# Patient Record
Sex: FEMALE | Race: WHITE | NOT HISPANIC OR LATINO | Employment: FULL TIME | URBAN - METROPOLITAN AREA
[De-identification: names, ages, dates, MRNs, and addresses within clinical notes are randomized per-mention and may not be internally consistent; named-entity substitution may affect disease eponyms.]

---

## 2017-05-08 ENCOUNTER — OFFICE VISIT (OUTPATIENT)
Dept: NEUROSURGERY | Facility: CLINIC | Age: 52
End: 2017-05-08
Attending: NEUROLOGICAL SURGERY
Payer: COMMERCIAL

## 2017-05-08 VITALS
DIASTOLIC BLOOD PRESSURE: 78 MMHG | RESPIRATION RATE: 16 BRPM | OXYGEN SATURATION: 97 % | BODY MASS INDEX: 26.78 KG/M2 | HEART RATE: 58 BPM | TEMPERATURE: 98.7 F | WEIGHT: 171 LBS | SYSTOLIC BLOOD PRESSURE: 138 MMHG

## 2017-05-08 DIAGNOSIS — D49.7 SPINAL CORD TUMOR: Primary | ICD-10-CM

## 2017-05-08 PROCEDURE — 99211 OFF/OP EST MAY X REQ PHY/QHP: CPT | Mod: ZF

## 2017-05-08 ASSESSMENT — PAIN SCALES - GENERAL: PAINLEVEL: MILD PAIN (2)

## 2017-05-08 NOTE — LETTER
5/8/2017       RE: Marj Holliday  [address] [place] [address]     Dear Colleague,    Thank you for referring your patient, Marj Holliday, to the [facility]. Please see a copy of my visit note below.    HISTORY OF PRESENT ILLNESS:  Ms. Holliday is a 51-year-old female returning to [facility] for followup of an intramedullary spinal cord mass that was initially detected in 05/2016.  At that time, she was initially offered surgery, however, she has continued to elect for monitoring.      Ms. Holliday states today that her symptoms have remained unchanged over the interval surveillance period.  She continues to have pain in her left groin and posterior thigh area.  The pain is sometimes associated with tingling.  The region affected has not increased in size or severity or frequency of occurrence.  She denies bowel or bladder dysfunction.  Denies imbalance.      Vitals:    05/08/17 1521   BP: 138/78   Pulse: 58   Resp: 16   Temp: 98.7  F (37.1  C)   SpO2: 97%   Weight: 77.6 kg (171 lb)     Body mass index is 26.78 kg/(m^2).  Mild Pain (2)   PHYSICAL EXAMINATION:  The patient is alert and appropriately communicative.  Sensation is intact in bilateral lower extremities.  Strength 5/5 in bilateral lower extremities.  DTRs were slightly hyperreflexic in bilateral lower extremities when compared to her upper extremities.  No clonus on right.  Very subtle 1-2 beat clonus noted in the left.  Negative Babinski bilaterally.      IMAGING:  Lumbar MRI dated 05/01/2017 was reviewed today in clinic.  This study demonstrates stable intramedullary spinal cord mass when compared to prior image.  Remains stable since 05/2016.  Measurement of mass is approximately 9x14 mm.      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  The patient has had stable symptoms.  Imaging has also remained stable over the interval surveillance period.  At this  time, it is reasonable to continue surveillance of the mass.      PLAN:  The patient to follow up in clinic in 6 months' time.  Prior to followup visit, the patient is to obtain a surveillance thoracic MRI at [facility].    I saw the patient with the resident.  I have reviewed and edited the resident note and agree with the plan of care.      Lavinia Finley MD       DICTATED BY:  Horacio Porter M.D., Resident         LAVINIA FINLEY MD            D: 2017 16:27   T: 2017 09:21   MT: SAL[place]OR      Name:     DAVID STEARNS   MRN:      [id number]        Account:      [id number]   :      1965           Service Date: 2017      Document: [id number]

## 2017-05-08 NOTE — MR AVS SNAPSHOT
After Visit Summary   5/8/2017    Marj Holliday    MRN: [id number]           Patient Information     Date Of Birth          1965        Visit Information        Provider Department      5/8/2017 3:30 PM Aung Back MD [facility]        Today's Diagnoses     Spinal cord tumor    -  1       Follow-ups after your visit        Follow-up notes from your care team     Return in about 6 months (around 11/8/2017) for Imaging.      Who to contact     If you have questions or need follow up information about today's clinic visit or your schedule please contact [facility] directly at [phone].  Normal or non-critical lab and imaging results will be communicated to you by MyChart, letter or phone within 4 business days after the clinic has received the results. If you do not hear from us within 7 days, please contact the clinic through [employer] Onehart or phone. If you have a critical or abnormal lab result, we will notify you by phone as soon as possible.  Submit refill requests through PerfectPost or call your pharmacy and they will forward the refill request to us. Please allow 3 business days for your refill to be completed.          Additional Information About Your Visit        MyChart Information     PerfectPost gives you secure access to your electronic health record. If you see a primary care provider, you can also send messages to your care team and make appointments. If you have questions, please call your primary care clinic.  If you do not have a primary care provider, please call [phone] and they will assist you.        Care EveryWhere ID     This is your Care EveryWhere ID. This could be used by other organizations to access your [facility] medical records  [id number]        Your Vitals Were     Pulse Temperature Respirations Pulse Oximetry BMI (Body Mass Index)       58 98.7  F (37.1  C) 16 97% 26.78 kg/m2        Blood Pressure from Last 3  Encounters:   05/08/17 138/78   12/19/16 116/77   08/15/16 124/83    Weight from Last 3 Encounters:   05/08/17 77.6 kg (171 lb)   12/19/16 77.6 kg (171 lb 1.6 oz)   08/15/16 78 kg (171 lb 15.3 oz)               Primary Care Provider Office Phone # Fax #    Aung Joshua Back [phone]        [facility] [address] [place] [address] [id number]  [facility] [address]        Thank you!     Thank you for choosing [facility]  for your care. Our goal is always to provide you with excellent care. Hearing back from our patients is one way we can continue to improve our services. Please take a few minutes to complete the written survey that you may receive in the mail after your visit with us. Thank you!             Your Updated Medication List - Protect others around you: Learn how to safely use, store and throw away your medicines at www.disposemymeds.org.      Notice  As of 5/8/2017 11:59 PM    You have not been prescribed any medications.

## 2017-05-08 NOTE — NURSING NOTE
"Oncology Rooming Note    May 8, 2017 3:23 PM   Marj Holliday is a 51 year old female who presents for:    Chief Complaint   Patient presents with     RECHECK     Patient here for follow up visit r/t spinal cord tumor.      Initial Vitals: /78  Pulse 58  Temp 98.7  F (37.1  C)  Resp 16  Wt 77.6 kg (171 lb)  SpO2 97%  BMI 26.78 kg/m2 Estimated body mass index is 26.78 kg/(m^2) as calculated from the following:    Height as of 5/6/08: 1.702 m (5' 7\").    Weight as of this encounter: 77.6 kg (171 lb). Body surface area is 1.92 meters squared.  Mild Pain (2) Comment: Data Unavailable   No LMP recorded.  Allergies reviewed: Yes  Medications reviewed: Yes    Medications: Medication refills not needed today.  Pharmacy name entered into [employer]: [id number] - [place], [place] - [address]    Clinical concerns: NA     3 minutes for nursing intake (face to face time)     Paige Quinones RN              "

## 2017-05-09 NOTE — PROGRESS NOTES
HISTORY OF PRESENT ILLNESS:  Ms. Holliday is a 51-year-old female returning to [facility] for followup of an intramedullary spinal cord mass that was initially detected in 05/2016.  At that time, she was initially offered surgery, however, she has continued to elect for monitoring.      Ms. Holliday states today that her symptoms have remained unchanged over the interval surveillance period.  She continues to have pain in her left groin and posterior thigh area.  The pain is sometimes associated with tingling.  The region affected has not increased in size or severity or frequency of occurrence.  She denies bowel or bladder dysfunction.  Denies imbalance.      Vitals:    05/08/17 1521   BP: 138/78   Pulse: 58   Resp: 16   Temp: 98.7  F (37.1  C)   SpO2: 97%   Weight: 77.6 kg (171 lb)     Body mass index is 26.78 kg/(m^2).  Mild Pain (2)   PHYSICAL EXAMINATION:  The patient is alert and appropriately communicative.  Sensation is intact in bilateral lower extremities.  Strength 5/5 in bilateral lower extremities.  DTRs were slightly hyperreflexic in bilateral lower extremities when compared to her upper extremities.  No clonus on right.  Very subtle 1-2 beat clonus noted in the left.  Negative Babinski bilaterally.      IMAGING:  Lumbar MRI dated 05/01/2017 was reviewed today in clinic.  This study demonstrates stable intramedullary spinal cord mass when compared to prior image.  Remains stable since 05/2016.  Measurement of mass is approximately 9x14 mm.      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  The patient has had stable symptoms.  Imaging has also remained stable over the interval surveillance period.  At this time, it is reasonable to continue surveillance of the mass.      PLAN:  The patient to follow up in clinic in 6 months' time.  Prior to followup visit, the patient is to obtain a surveillance thoracic MRI at [facility].    I saw the patient with the  resident.  I have reviewed and edited the resident note and agree with the plan of care.      Lavinia Finley MD       DICTATED BY:  Elliott Porter M.D., Resident         LAVINIA FINLEY MD       As dictated by ELLIOTT PORTER MD            D: 2017 16:27   T: 2017 09:21   MT: JV      Name:     DAVID STEARNS   MRN:      [id number]        Account:      [id number]   :      1965           Service Date: 2017      Document: [id number]

## 2017-05-30 ENCOUNTER — HOSPITAL ENCOUNTER (OUTPATIENT)
Dept: MAMMOGRAPHY | Facility: CLINIC | Age: 52
Discharge: HOME | End: 2017-05-30
Attending: OBSTETRICS & GYNECOLOGY | Admitting: OBSTETRICS & GYNECOLOGY
Payer: COMMERCIAL

## 2017-05-30 DIAGNOSIS — Z12.31 VISIT FOR SCREENING MAMMOGRAM: ICD-10-CM

## 2017-05-30 PROCEDURE — G0202 SCR MAMMO BI INCL CAD: HCPCS

## 2017-06-17 ENCOUNTER — HEALTH MAINTENANCE LETTER (OUTPATIENT)
Age: 52
End: 2017-06-17

## 2017-11-13 ENCOUNTER — OFFICE VISIT (OUTPATIENT)
Dept: NEUROSURGERY | Facility: CLINIC | Age: 52
End: 2017-11-13
Attending: NEUROLOGICAL SURGERY
Payer: COMMERCIAL

## 2017-11-13 VITALS
BODY MASS INDEX: 28.05 KG/M2 | DIASTOLIC BLOOD PRESSURE: 77 MMHG | SYSTOLIC BLOOD PRESSURE: 136 MMHG | RESPIRATION RATE: 18 BRPM | WEIGHT: 179.1 LBS | OXYGEN SATURATION: 100 % | HEART RATE: 60 BPM | TEMPERATURE: 98.1 F

## 2017-11-13 DIAGNOSIS — D49.7 SPINAL CORD TUMOR: Primary | ICD-10-CM

## 2017-11-13 PROCEDURE — 99212 OFFICE O/P EST SF 10 MIN: CPT | Mod: ZF

## 2017-11-13 ASSESSMENT — PAIN SCALES - GENERAL: PAINLEVEL: MILD PAIN (3)

## 2017-11-13 NOTE — MR AVS SNAPSHOT
After Visit Summary   11/13/2017    Marj Holliday    MRN: [id number]           Patient Information     Date Of Birth          1965        Visit Information        Provider Department      11/13/2017 4:00 PM Aung Back MD [facility]        Today's Diagnoses     Spinal cord tumor    -  1       Follow-ups after your visit        Follow-up notes from your care team     Return in about 6 months (around 5/13/2018) for Imaging.      Your next 10 appointments already scheduled     May 14, 2018  4:00 PM CDT   (Arrive by 3:45 PM)   Return Visit with Aung Back MD   [facility]    [address]  [address] [facility] [address] [phone]              Who to contact     If you have questions or need follow up information about today's clinic visit or your schedule please contact [facility] directly at [phone].  Normal or non-critical lab and imaging results will be communicated to you by OneSpothart, letter or phone within 4 business days after the clinic has received the results. If you do not hear from us within 7 days, please contact the clinic through PopUpt or phone. If you have a critical or abnormal lab result, we will notify you by phone as soon as possible.  Submit refill requests through [email or web address] or call your pharmacy and they will forward the refill request to us. Please allow 3 business days for your refill to be completed.          Additional Information About Your Visit        MyChart Information     [email or web address] gives you secure access to your electronic health record. If you see a primary care provider, you can also send messages to your care team and make appointments. If you have questions, please call your primary care clinic.  If you do not have a primary care provider, please call [phone] and they will assist you.        Care EveryWhere ID     This  is your Care EveryWhere ID. This could be used by other organizations to access your [facility] medical records  [id number]        Your Vitals Were     Pulse Temperature Respirations Pulse Oximetry BMI (Body Mass Index)       60 98.1  F (36.7  C) (Oral) 18 100% 28.05 kg/m2        Blood Pressure from Last 3 Encounters:   11/13/17 136/77   05/08/17 138/78   12/19/16 116/77    Weight from Last 3 Encounters:   11/13/17 81.2 kg (179 lb 1.6 oz)   05/08/17 77.6 kg (171 lb)   12/19/16 77.6 kg (171 lb 1.6 oz)               Primary Care Provider Office Phone # Fax #    Aung Joshua Back [phone]       [id number]  [facility] [address]        Equal Access to Services     [facility]: Hadii aad ku hadasho Soomaali, waaxda luqadaha, qaybta kaalmada adeegyada, waxay lawandain hayaan amada hall . So [facility] [phone].    ATENCIÓN: Si habla español, tiene a cai disposición servicios gratuitos de asistencia lingüística. Solaame al [phone].    We comply with applicable federal civil rights laws and [place] laws. We do not discriminate on the basis of race, color, national origin, age, disability, sex, sexual orientation, or gender identity.            Thank you!     Thank you for choosing [facility]  for your care. Our goal is always to provide you with excellent care. Hearing back from our patients is one way we can continue to improve our services. Please take a few minutes to complete the written survey that you may receive in the mail after your visit with us. Thank you!             Your Updated Medication List - Protect others around you: Learn how to safely use, store and throw away your medicines at www.disposemymeds.org.          This list is accurate as of: 11/13/17 11:59 PM.  Always use your most recent med list.                   Brand Name Dispense Instructions for use Diagnosis    ADVIL PO

## 2017-11-13 NOTE — PROGRESS NOTES
HISTORY OF PRESENT ILLNESS:  It was a pleasure to see Ms. Holliday back in Neurosurgery Clinic today for a 6-month followup of a T12 intramedullary mass.  As you may recall, the mass was initially discovered in 05/2016.  Following discovery of the mass, there was a discussion about possible surgical resection versus watchful waiting.  After subsequent surveillance scans, as well as a second opinion at [facility], she elected to proceed with watchful waiting.      In clinic today, Ms. Holliday states she has not had any progression of her symptoms, nor any new symptoms.  She does note she was recently diagnosed with right-sided TMJ, and has recently received a splint.  Otherwise, she has no new concerns.      PHYSICAL EXAMINATION:   GENERAL:  This is a middle-aged female sitting in the exam chair in no acute distress.   STRENGTH:  5/5 and symmetric in the lower extremities.   REFLEXES:  Slightly hyperreflexic throughout the lower extremities.  There are 1-2 beats of clonus noted in bilateral lower extremities.   SENSATION:  Intact to light touch throughout.      IMAGING:  MRI of the lumbar spine acquired 11/07/2017 was reviewed and demonstrates no significant change of the intramedullary mass located at approximately T12, compared to prior studies going back to the initial scan in 05/2016.      ASSESSMENT:  Ms. Holliday is a 52-year-old female with an intramedullary spinal cord lesion at approximately the T12 level, which has been stable for approximately 1-1/2 years.  We discussed continuation of the current surveillance regimen, and the patient was in agreement.      PLAN:  Follow up in 6 months with a repeat MRI of the lumbar spine including T12 with and without contrast prior to the visit.      I saw the patient with the resident.  I have reviewed and edited the resident note and agree with the plan of care.      MD LAVINIA Panchal MD       As dictated by JOELLEN SLOAN MD, PHD             D: 2017 16:39   T: 2017 16:54   MT:       Name:     DAVID STEARNS   MRN:      [id number]        Account:      [id number]   :      1965           Service Date: 2017      Document: [id number]

## 2017-11-13 NOTE — LETTER
11/13/2017       RE: Marj Holliday  [address] [place] [address]     Dear Colleague,    Thank you for referring your patient, Marj Holliday, to the [facility]. Please see a copy of my visit note below.    HISTORY OF PRESENT ILLNESS:  It was a pleasure to see Ms. Holliday back in Neurosurgery Clinic today for a 6-month followup of a T12 intramedullary mass.  As you may recall, the mass was initially discovered in 05/2016.  Following discovery of the mass, there was a discussion about possible surgical resection versus watchful waiting.  After subsequent surveillance scans, as well as a second opinion at [facility], she elected to proceed with watchful waiting.      In clinic today, Ms. Holliday states she has not had any progression of her symptoms, nor any new symptoms.  She does note she was recently diagnosed with right-sided TMJ, and has recently received a splint.  Otherwise, she has no new concerns.      PHYSICAL EXAMINATION:   GENERAL:  This is a middle-aged female sitting in the exam chair in no acute distress.   STRENGTH:  5/5 and symmetric in the lower extremities.   REFLEXES:  Slightly hyperreflexic throughout the lower extremities.  There are 1-2 beats of clonus noted in bilateral lower extremities.   SENSATION:  Intact to light touch throughout.      IMAGING:  MRI of the lumbar spine acquired 11/07/2017 was reviewed and demonstrates no significant change of the intramedullary mass located at approximately T12, compared to prior studies going back to the initial scan in 05/2016.      ASSESSMENT:  Ms. Holliday is a 52-year-old female with an intramedullary spinal cord lesion at approximately the T12 level, which has been stable for approximately 1-1/2 years.  We discussed continuation of the current surveillance regimen, and the patient was in agreement.      PLAN:  Follow up in 6 months with a repeat MRI of the lumbar spine including T12 with and without contrast  prior to the visit.      I saw the patient with the resident.  I have reviewed and edited the resident note and agree with the plan of care.      MD LAVINIA Panchal MD       As dictated by JOELLEN SLOAN MD, PHD            D: 2017 16:39   T: 2017 16:54   MT: MARIBEL      Name:     DAVID STEARNS   MRN:      [id number]        Account:      [id number]   :      1965           Service Date: 2017      Document: [id number]        Again, thank you for allowing me to participate in the care of your patient.      Sincerely,    Lavinia Back MD

## 2017-11-13 NOTE — NURSING NOTE
"Oncology Rooming Note    November 13, 2017 3:56 PM   Marj Holliday is a 52 year old female who presents for:    Chief Complaint   Patient presents with     RECHECK     Patient with spinal cord tumor here for provider     Initial Vitals: /77  Pulse 60  Temp 98.1  F (36.7  C) (Oral)  Resp 18  Wt 81.2 kg (179 lb 1.6 oz)  SpO2 100%  BMI 28.05 kg/m2 Estimated body mass index is 28.05 kg/(m^2) as calculated from the following:    Height as of 5/6/08: 1.702 m (5' 7\").    Weight as of this encounter: 81.2 kg (179 lb 1.6 oz). Body surface area is 1.96 meters squared.  Mild Pain (3) Comment: Data Unavailable   No LMP recorded.  Allergies reviewed: Yes  Medications reviewed: Yes    Medications: Medication refills not needed today.  Pharmacy name entered into UpCity: [id number] - [place], [place] - [address]    Clinical concerns: NA NA was NOT notified.    5 minutes for nursing intake (face to face time)     Jackie Billingsley CMA              "

## 2018-05-07 ENCOUNTER — TRANSFERRED RECORDS (OUTPATIENT)
Dept: HEALTH INFORMATION MANAGEMENT | Facility: CLINIC | Age: 53
End: 2018-05-07

## 2018-05-14 ENCOUNTER — OFFICE VISIT (OUTPATIENT)
Dept: NEUROSURGERY | Facility: CLINIC | Age: 53
End: 2018-05-14
Attending: NEUROLOGICAL SURGERY
Payer: COMMERCIAL

## 2018-05-14 VITALS
RESPIRATION RATE: 18 BRPM | WEIGHT: 176.4 LBS | SYSTOLIC BLOOD PRESSURE: 118 MMHG | TEMPERATURE: 97.3 F | OXYGEN SATURATION: 99 % | HEIGHT: 67 IN | DIASTOLIC BLOOD PRESSURE: 80 MMHG | BODY MASS INDEX: 27.69 KG/M2 | HEART RATE: 60 BPM

## 2018-05-14 DIAGNOSIS — D49.7 SPINAL CORD TUMOR: Primary | ICD-10-CM

## 2018-05-14 PROCEDURE — G0463 HOSPITAL OUTPT CLINIC VISIT: HCPCS | Mod: ZF

## 2018-05-14 ASSESSMENT — PAIN SCALES - GENERAL: PAINLEVEL: MILD PAIN (2)

## 2018-05-14 NOTE — NURSING NOTE
"Oncology Rooming Note    May 14, 2018 4:13 PM   Marj Holliday is a 52 year old female who presents for:    Chief Complaint   Patient presents with     RECHECK     Spinal cord tumor     Initial Vitals: /80 (BP Location: Right arm, Patient Position: Chair, Cuff Size: Adult Regular)  Pulse 60  Temp 97.3  F (36.3  C) (Oral)  Resp 18  Ht 1.702 m (5' 7.01\")  Wt 80 kg (176 lb 6.4 oz)  LMP 04/22/2008  SpO2 99%  Breastfeeding? No  BMI 27.62 kg/m2 Estimated body mass index is 27.62 kg/(m^2) as calculated from the following:    Height as of this encounter: 1.702 m (5' 7.01\").    Weight as of this encounter: 80 kg (176 lb 6.4 oz). Body surface area is 1.94 meters squared.  Mild Pain (2) Comment: TMJ/groin/back of thy/ headaches   Patient's last menstrual period was 04/22/2008.  Allergies reviewed: Yes  Medications reviewed: Yes    Medications: Medication refills not needed today.  Pharmacy name entered into [employer]: [id number] - [place], [place] - [address]    Clinical concerns:  No new concerns  Provider was notified.    7 minutes for nursing intake (face to face time)     Kati Bahena MA              "

## 2018-05-14 NOTE — LETTER
"5/14/2018       RE: Marj Holliday  [address] [place] [address]     Dear Colleague,    Thank you for referring your patient, Marj Holliday, to the [facility]ER [facility]INIC. Please see a copy of my visit note below.    It was a pleasure to see Marj Holliday today in Neurosurgery Clinic. She is a 52 year old female with a T12 spinal cord lesion we have been following since 2016. No new symptoms.    Vitals:    05/14/18 1608   BP: 118/80   BP Location: Right arm   Patient Position: Chair   Cuff Size: Adult Regular   Pulse: 60   Resp: 18   Temp: 97.3  F (36.3  C)   TempSrc: Oral   SpO2: 99%   Weight: 80 kg (176 lb 6.4 oz)   Height: 1.702 m (5' 7.01\")     Body mass index is 27.62 kg/(m^2).  Mild Pain (2)    Awake, alert.  Neuro intact.    Imaging: MRI appears stable. No changes since May 2016.    Assessment: Spinal cord tumor. Stable.    Plan: RTC 6m + MRI.     Again, thank you for allowing me to participate in the care of your patient.      Sincerely,    Aung Back MD      "

## 2018-05-14 NOTE — PROGRESS NOTES
"It was a pleasure to see Marj Holliday today in Neurosurgery Clinic. She is a 52 year old female with a T12 spinal cord lesion we have been following since 2016. No new symptoms.    Vitals:    05/14/18 1608   BP: 118/80   BP Location: Right arm   Patient Position: Chair   Cuff Size: Adult Regular   Pulse: 60   Resp: 18   Temp: 97.3  F (36.3  C)   TempSrc: Oral   SpO2: 99%   Weight: 80 kg (176 lb 6.4 oz)   Height: 1.702 m (5' 7.01\")     Body mass index is 27.62 kg/(m^2).  Mild Pain (2)    Awake, alert.  Neuro intact.    Imaging: MRI appears stable. No changes since May 2016.    Assessment: Spinal cord tumor. Stable.    Plan: RTC 6m + MRI.   "

## 2018-05-14 NOTE — MR AVS SNAPSHOT
After Visit Summary   5/14/2018    Marj Holliday    MRN: [id number]           Patient Information     Date Of Birth          1965        Visit Information        Provider Department      5/14/2018 4:00 PM Aung Back MD [facility], located within [facility]        Today's Diagnoses     Spinal cord tumor    -  1       Follow-ups after your visit        Follow-up notes from your care team     Return in about 6 months (around 11/14/2018) for Imaging.      Your next 10 appointments already scheduled     Nov 12, 2018  4:00 PM CST   (Arrive by 3:45 PM)   Return Visit with Aung Back MD   [facility] ([facility] [place])    [address]  [address]  [facility] [address] [phone]              Who to contact     If you have questions or need follow up information about today's clinic visit or your schedule please contact [facility] directly at [phone].  Normal or non-critical lab and imaging results will be communicated to you by Atterohart, letter or phone within 4 business days after the clinic has received the results. If you do not hear from us within 7 days, please contact the clinic through Casabit or phone. If you have a critical or abnormal lab result, we will notify you by phone as soon as possible.  Submit refill requests through Chumby or call your pharmacy and they will forward the refill request to us. Please allow 3 business days for your refill to be completed.          Additional Information About Your Visit        MyChart Information     Chumby gives you secure access to your electronic health record. If you see a primary care provider, you can also send messages to your care team and make appointments. If you have questions, please call your primary care clinic.  If you do not have a primary care provider, please call [phone] and they will assist you.        Care EveryWhere ID     This is  "your Care EveryWhere ID. This could be used by other organizations to access your [place] medical records  [id number]        Your Vitals Were     Pulse Temperature Respirations Height Last Period Pulse Oximetry    60 97.3  F (36.3  C) (Oral) 18 1.702 m (5' 7.01\") 04/22/2008 99%    Breastfeeding? BMI (Body Mass Index)                No 27.62 kg/m2           Blood Pressure from Last 3 Encounters:   05/14/18 118/80   11/13/17 136/77   05/08/17 138/78    Weight from Last 3 Encounters:   05/14/18 80 kg (176 lb 6.4 oz)   11/13/17 81.2 kg (179 lb 1.6 oz)   05/08/17 77.6 kg (171 lb)               Primary Care Provider Office Phone # Fax #    Aung Joshua Back [phone]       [id number]  [facility] [address]        Equal Access to Services     GRAYSON [facility]: Hadii aad ku hadasho Soomaali, waaxda luqadaha, qaybta kaalmada adeegyada, waxay idiin hayjennyn amada rojas larashaad . So [facility] [phone].    ATENCIÓN: Si habla español, tiene a cai disposición servicios gratuitos de asistencia lingüística. [facility] [phone].    We comply with applicable federal civil rights laws and [place] laws. We do not discriminate on the basis of race, color, national origin, age, disability, sex, sexual orientation, or gender identity.            Thank you!     Thank you for choosing [facility] [place] [facility]  for your care. Our goal is always to provide you with excellent care. Hearing back from our patients is one way we can continue to improve our services. Please take a few minutes to complete the written survey that you may receive in the mail after your visit with us. Thank you!             Your Updated Medication List - Protect others around you: Learn how to safely use, store and throw away your medicines at www.disposemymeds.org.          This list is accurate as of 5/14/18 11:59 PM.  Always use your most recent med list.                   Brand Name Dispense Instructions for use Diagnosis "    ADVIL PO

## 2018-07-06 ENCOUNTER — HOSPITAL ENCOUNTER (OUTPATIENT)
Dept: MAMMOGRAPHY | Facility: CLINIC | Age: 53
Discharge: HOME | End: 2018-07-06
Attending: OBSTETRICS & GYNECOLOGY | Admitting: OBSTETRICS & GYNECOLOGY
Payer: COMMERCIAL

## 2018-07-06 DIAGNOSIS — Z12.31 VISIT FOR SCREENING MAMMOGRAM: ICD-10-CM

## 2018-07-06 PROCEDURE — 77063 BREAST TOMOSYNTHESIS BI: CPT

## 2018-08-07 ENCOUNTER — TELEPHONE (OUTPATIENT)
Dept: ONCOLOGY | Facility: CLINIC | Age: 53
End: 2018-08-07

## 2018-08-07 NOTE — TELEPHONE ENCOUNTER
----- Message from [facility] sent at 8/6/2018  3:58 PM CDT -----  Regarding: new/different headaches  ( Mon 306p) Marj called because she has started having a different kind of headache and wonders if she should have a full MRI and be seen by Dr. Back or a neurologist. Pls call [phone]

## 2018-08-07 NOTE — TELEPHONE ENCOUNTER
Returned call to patient. Unable to reach. LM for patient, recommended following up with PCP for headache workup, as headaches were not a symptom she has had related to the spinal tumor followed by Dr. Bakc. Asked patient to return call to clinic to further discuss.

## 2018-08-08 NOTE — TELEPHONE ENCOUNTER
Patient returned call to clinic. Describes her headaches starting in the base of her skull and around to R side to ear up to R eye. Describes as cluster and migraine like, improve with ibuprofen. She has recently been treated for TMJ problems. Patient questions if they could be related to T12 lesion and if she should have a change in her follow up imaging. Discussed with Dr. Back, not related to T12 lesion and no change in imaging. Called patient with recommendations. She voiced understanding and had no further questions at this time.

## 2018-11-05 ENCOUNTER — TRANSFERRED RECORDS (OUTPATIENT)
Dept: HEALTH INFORMATION MANAGEMENT | Facility: CLINIC | Age: 53
End: 2018-11-05

## 2018-11-12 ENCOUNTER — OFFICE VISIT (OUTPATIENT)
Dept: NEUROSURGERY | Facility: CLINIC | Age: 53
End: 2018-11-12
Attending: NEUROLOGICAL SURGERY
Payer: COMMERCIAL

## 2018-11-12 VITALS
WEIGHT: 179.9 LBS | HEART RATE: 98 BPM | DIASTOLIC BLOOD PRESSURE: 77 MMHG | SYSTOLIC BLOOD PRESSURE: 126 MMHG | BODY MASS INDEX: 28.17 KG/M2 | RESPIRATION RATE: 16 BRPM | OXYGEN SATURATION: 98 %

## 2018-11-12 DIAGNOSIS — D49.7 SPINAL CORD TUMOR: Primary | ICD-10-CM

## 2018-11-12 PROCEDURE — 40000114 ZZH STATISTIC NO CHARGE CLINIC VISIT

## 2018-11-12 NOTE — LETTER
11/12/2018       RE: Marj Holliday  [address] [place] [address]     Dear Colleague,    Thank you for referring your patient, Marj Holliday, to the [facility]. Please see a copy of my visit note below.    It was a pleasure to see Marj Holliday today in Neurosurgery Clinic. 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 She has been stable overall, and her main issues today seem to involve her TMJ. No new numbness tingling, weakness or any other concerning neurologic symptoms.    Vitals:    11/12/18 1512   BP: 126/77   Pulse: 98   Resp: 16   SpO2: 98%   Weight: 81.6 kg (179 lb 14.4 oz)     Body mass index is 28.17 kg/(m^2).  Data Unavailable    Awake, alert.  Neuro intact.    Imaging: MRI from [facility] is stable when compared back to May of 2016. The intramedullary lesion appears basically identical. There is another small area of enhancement at L4-5 that is also stable.    Assessment: Spinal intramedullary tumor. Stable.    Plan: She will come back in 1 year with a repeat MRI. She wishes to avoid contrast, given concerns about long term consequences of repeated contrast administration. Given the tumor is clearly visible on non-contrast sequences, we will order a non-contrast MRI for her next visit at [facility] one week prior to clinic visit, where she has done her previous images. She knows to call sooner for changes or worsening of symptoms.     Again, thank you for allowing me to participate in the care of your patient.      Sincerely,    Aung Back MD

## 2018-11-12 NOTE — PROGRESS NOTES
It was a pleasure to see Marj Holliday today in Neurosurgery Clinic. 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 She has been stable overall, and her main issues today seem to involve her TMJ. No new numbness tingling, weakness or any other concerning neurologic symptoms.    Vitals:    11/12/18 1512   BP: 126/77   Pulse: 98   Resp: 16   SpO2: 98%   Weight: 81.6 kg (179 lb 14.4 oz)     Body mass index is 28.17 kg/(m^2).  Data Unavailable    Awake, alert.  Neuro intact.    Imaging: MRI from [facility] is stable when compared back to May of 2016. The intramedullary lesion appears basically identical. There is another small area of enhancement at L4-5 that is also stable.    Assessment: Spinal intramedullary tumor. Stable.    Plan: She will come back in 1 year with a repeat MRI. She wishes to avoid contrast, given concerns about long term consequences of repeated contrast administration. Given the tumor is clearly visible on non-contrast sequences, we will order a non-contrast MRI for her next visit at [facility] one week prior to clinic visit, where she has done her previous images. She knows to call sooner for changes or worsening of symptoms.

## 2018-11-12 NOTE — MR AVS SNAPSHOT
After Visit Summary   11/12/2018    Marj Holliday    MRN: [id number]           Patient Information     Date Of Birth          1965        Visit Information        Provider Department      11/12/2018 3:15 PM Aung Back MD [facility]        Today's Diagnoses     Spinal cord tumor    -  1       Follow-ups after your visit        Follow-up notes from your care team     Return in about 1 year (around 11/12/2019) for Imaging.      Your next 10 appointments already scheduled     Nov 11, 2019  3:45 PM CST   (Arrive by 3:30 PM)   Return Visit with Aung Back MD   [facility] ([facility] [place])    [address]  [address]  [facility] [address] [phone]              Who to contact     If you have questions or need follow up information about today's clinic visit or your schedule please contact [facility] directly at [phone].  Normal or non-critical lab and imaging results will be communicated to you by TradeYahart, letter or phone within 4 business days after the clinic has received the results. If you do not hear from us within 7 days, please contact the clinic through Adayanat or phone. If you have a critical or abnormal lab result, we will notify you by phone as soon as possible.  Submit refill requests through [facility] or call your pharmacy and they will forward the refill request to us. Please allow 3 business days for your refill to be completed.          Additional Information About Your Visit        MyChart Information     [facility] gives you secure access to your electronic health record. If you see a primary care provider, you can also send messages to your care team and make appointments. If you have questions, please call your primary care clinic.  If you do not have a primary care provider, please call [phone] and they will assist you.        Care EveryWhere ID     This is  your Care EveryWhere ID. This could be used by other organizations to access your [place] medical records  [id number]        Your Vitals Were     Pulse Respirations Last Period Pulse Oximetry BMI (Body Mass Index)       98 16 04/22/2008 98% 28.17 kg/m2        Blood Pressure from Last 3 Encounters:   11/12/18 126/77   05/14/18 118/80   11/13/17 136/77    Weight from Last 3 Encounters:   11/12/18 81.6 kg (179 lb 14.4 oz)   05/14/18 80 kg (176 lb 6.4 oz)   11/13/17 81.2 kg (179 lb 1.6 oz)               Primary Care Provider Office Phone # Fax #    Aung Joshua Back [phone]       [id number]  [facility] [address]        Equal Access to Services     [facility] : Hadii meenakshi nicholson hadasho Soomaali, waaxda luqadaha, qaybta kaalmada adeegyada, precious rojas hayrebecca hall . So [facility] [phone].    ATENCIÓN: Si habla español, tiene a cai disposición servicios gratuitos de asistencia lingüística. Hayes al [phone].    We comply with applicable federal civil rights laws and [place] laws. We do not discriminate on the basis of race, color, national origin, age, disability, sex, sexual orientation, or gender identity.            Thank you!     Thank you for choosing [facility] [place] [facility]  for your care. Our goal is always to provide you with excellent care. Hearing back from our patients is one way we can continue to improve our services. Please take a few minutes to complete the written survey that you may receive in the mail after your visit with us. Thank you!             Your Updated Medication List - Protect others around you: Learn how to safely use, store and throw away your medicines at www.disposemymeds.org.          This list is accurate as of 11/12/18  3:38 PM.  Always use your most recent med list.                   Brand Name Dispense Instructions for use Diagnosis    ADVIL PO           MULTIPLE VITAMIN/IRON OR           PROBIOTIC PRODUCT PO       culturelle        WHEAT DEXTRIN PO

## 2019-07-10 ENCOUNTER — HOSPITAL ENCOUNTER (OUTPATIENT)
Dept: MAMMOGRAPHY | Facility: CLINIC | Age: 54
Discharge: HOME | End: 2019-07-10
Attending: OBSTETRICS & GYNECOLOGY | Admitting: OBSTETRICS & GYNECOLOGY
Payer: COMMERCIAL

## 2019-07-10 DIAGNOSIS — Z12.31 VISIT FOR SCREENING MAMMOGRAM: ICD-10-CM

## 2019-07-10 PROCEDURE — 77063 BREAST TOMOSYNTHESIS BI: CPT

## 2019-09-16 DIAGNOSIS — D49.7 SPINAL CORD TUMOR: Primary | ICD-10-CM

## 2019-10-01 ENCOUNTER — HEALTH MAINTENANCE LETTER (OUTPATIENT)
Age: 54
End: 2019-10-01

## 2019-11-08 ENCOUNTER — TRANSFERRED RECORDS (OUTPATIENT)
Dept: HEALTH INFORMATION MANAGEMENT | Facility: CLINIC | Age: 54
End: 2019-11-08

## 2019-11-22 ENCOUNTER — OFFICE VISIT (OUTPATIENT)
Dept: NEUROSURGERY | Facility: CLINIC | Age: 54
End: 2019-11-22
Attending: NEUROLOGICAL SURGERY
Payer: COMMERCIAL

## 2019-11-22 VITALS
DIASTOLIC BLOOD PRESSURE: 85 MMHG | OXYGEN SATURATION: 99 % | RESPIRATION RATE: 16 BRPM | WEIGHT: 178 LBS | HEART RATE: 55 BPM | BODY MASS INDEX: 27.94 KG/M2 | HEIGHT: 67 IN | SYSTOLIC BLOOD PRESSURE: 127 MMHG | TEMPERATURE: 98.1 F

## 2019-11-22 DIAGNOSIS — D49.7 SPINAL CORD TUMOR: Primary | ICD-10-CM

## 2019-11-22 PROCEDURE — 99213 OFFICE O/P EST LOW 20 MIN: CPT | Performed by: NEUROLOGICAL SURGERY

## 2019-11-22 PROCEDURE — G0463 HOSPITAL OUTPT CLINIC VISIT: HCPCS

## 2019-11-22 RX ORDER — INDOMETHACIN 25 MG/5ML
SUSPENSION ORAL
COMMUNITY

## 2019-11-22 ASSESSMENT — MIFFLIN-ST. JEOR: SCORE: 1445.03

## 2019-11-22 ASSESSMENT — PAIN SCALES - GENERAL: PAINLEVEL: NO PAIN (1)

## 2019-11-22 NOTE — NURSING NOTE
"Marj Holliday is a 54 year old female who presents for:  Chief Complaint   Patient presents with     Follow Up        Initial Vitals:  /85   Pulse 55   Temp 98.1  F (36.7  C) (Oral)   Resp 16   Ht 5' 7\" (1.702 m)   Wt 178 lb (80.7 kg)   LMP 04/22/2008   SpO2 99%   BMI 27.88 kg/m   Estimated body mass index is 27.88 kg/m  as calculated from the following:    Height as of this encounter: 5' 7\" (1.702 m).    Weight as of this encounter: 178 lb (80.7 kg).. Body surface area is 1.95 meters squared. BP completed using cuff size: regular  No Pain (1)        Nursing Comments: Pt present today for follow up.        Tyler Moran, [facility]    "

## 2019-11-22 NOTE — PROGRESS NOTES
It was a pleasure to see Marj Holliday today in Neurosurgery Clinic. She is a 54 year old female with a mass in the distal spinal cord that we have been following radiographically since 2016.  Her she has no new symptoms related to this.  She has had previous problems with headaches which have improved with treatment with indomethacin.    Past Medical History:   Diagnosis Date     Allergic rhinitis, cause unspecified      Past Surgical History:   Procedure Laterality Date     C NONSPECIFIC PROCEDURE      Brainard Teeth        Allergies   Allergen Reactions     No Known Drug Allergies        Current Outpatient Medications:      indomethacin (INDOCIN) 25 MG/5ML SUSP suspension, , Disp: , Rfl:      Multiple Vitamins-Iron (MULTIPLE VITAMIN/IRON OR), , Disp: , Rfl:      PANTOPRAZOLE SODIUM PO, , Disp: , Rfl:      PROBIOTIC PRODUCT PO, culturelle, Disp: , Rfl:      WHEAT DEXTRIN PO, , Disp: , Rfl:      Ibuprofen (ADVIL PO), , Disp: , Rfl:   Social History     Socioeconomic History     Marital status:      Spouse name: None     Number of children: None     Years of education: None     Highest education level: None   Occupational History     None   Social Needs     Financial resource strain: None     Food insecurity:     Worry: None     Inability: None     Transportation needs:     Medical: None     Non-medical: None   Tobacco Use     Smoking status: Never Smoker     Smokeless tobacco: Never Used   Substance and Sexual Activity     Alcohol use: None     Drug use: None     Sexual activity: None   Lifestyle     Physical activity:     Days per week: None     Minutes per session: None     Stress: None   Relationships     Social connections:     Talks on phone: None     Gets together: None     Attends Christianity service: None     Active member of club or organization: None     Attends meetings of clubs or organizations: None     Relationship status: None     Intimate partner violence:     Fear of current or ex  "partner: None     Emotionally abused: None     Physically abused: None     Forced sexual activity: None   Other Topics Concern     None   Social History Narrative     None          ROS: 10 point ROS neg other than the symptoms noted above in the HPI.    Vitals:    11/22/19 0900   BP: 127/85   Pulse: 55   Resp: 16   Temp: 98.1  F (36.7  C)   TempSrc: Oral   SpO2: 99%   Weight: 178 lb (80.7 kg)   Height: 5' 7\" (1.702 m)     Body mass index is 27.88 kg/m .  No Pain (1)    Awake alert and oriented.  Strength in bilateral upper and lower extremities 5 out of 5 in all muscle groups.  Gait normal.    Imaging: Her MRI of the lumbar spine shows no change in the mass in the distal spinal cord when compared to her previous MRI as well as back to 2016.  Imaging was reviewed with patient shown the patient in clinic today.    Assessment: Spinal cord tumor.  Stable.    Plan: We will have her get a repeat MRI without contrast in 1 year.  The without contrast is at her request which I think is reasonable.  She will get this prior to an appointment which will be scheduled next November.  We will provide her results when we have them and she may then cancel the appointment.      "

## 2020-07-24 ENCOUNTER — HOSPITAL ENCOUNTER (OUTPATIENT)
Dept: MAMMOGRAPHY | Facility: CLINIC | Age: 55
Discharge: HOME | End: 2020-07-24
Attending: OBSTETRICS & GYNECOLOGY | Admitting: OBSTETRICS & GYNECOLOGY
Payer: COMMERCIAL

## 2020-07-24 DIAGNOSIS — Z12.31 VISIT FOR SCREENING MAMMOGRAM: ICD-10-CM

## 2020-07-24 PROCEDURE — 77063 BREAST TOMOSYNTHESIS BI: CPT

## 2020-11-09 ENCOUNTER — TRANSFERRED RECORDS (OUTPATIENT)
Dept: HEALTH INFORMATION MANAGEMENT | Facility: CLINIC | Age: 55
End: 2020-11-09

## 2020-11-16 ENCOUNTER — OFFICE VISIT (OUTPATIENT)
Dept: NEUROSURGERY | Facility: CLINIC | Age: 55
End: 2020-11-16
Attending: NEUROLOGICAL SURGERY
Payer: COMMERCIAL

## 2020-11-16 VITALS
SYSTOLIC BLOOD PRESSURE: 124 MMHG | WEIGHT: 166.4 LBS | TEMPERATURE: 97.9 F | DIASTOLIC BLOOD PRESSURE: 78 MMHG | OXYGEN SATURATION: 98 % | HEIGHT: 67 IN | BODY MASS INDEX: 26.12 KG/M2 | HEART RATE: 66 BPM

## 2020-11-16 DIAGNOSIS — D49.7 SPINAL CORD TUMOR: Primary | ICD-10-CM

## 2020-11-16 PROCEDURE — 99213 OFFICE O/P EST LOW 20 MIN: CPT | Performed by: NEUROLOGICAL SURGERY

## 2020-11-16 PROCEDURE — G0463 HOSPITAL OUTPT CLINIC VISIT: HCPCS

## 2020-11-16 SDOH — HEALTH STABILITY: MENTAL HEALTH: HOW OFTEN DO YOU HAVE A DRINK CONTAINING ALCOHOL?: 2-3 TIMES A WEEK

## 2020-11-16 SDOH — HEALTH STABILITY: MENTAL HEALTH: HOW OFTEN DO YOU HAVE 6 OR MORE DRINKS ON ONE OCCASION?: NEVER

## 2020-11-16 SDOH — HEALTH STABILITY: MENTAL HEALTH: HOW MANY STANDARD DRINKS CONTAINING ALCOHOL DO YOU HAVE ON A TYPICAL DAY?: 1 OR 2

## 2020-11-16 ASSESSMENT — MIFFLIN-ST. JEOR: SCORE: 1387.42

## 2020-11-16 ASSESSMENT — PAIN SCALES - GENERAL: PAINLEVEL: MILD PAIN (2)

## 2020-11-16 NOTE — NURSING NOTE
"Marj Holliday is a 55 year old female who presents for:  Chief Complaint   Patient presents with     Neurologic Problem     F/U Spinal Cord Tumor        Initial Vitals:  /78 (BP Location: Right arm, Patient Position: Sitting, Cuff Size: Adult Regular)   Pulse 66   Temp 97.9  F (36.6  C) (Oral)   Ht 5' 7\" (1.702 m)   Wt 166 lb 6.4 oz (75.5 kg)   LMP 04/22/2008   SpO2 98%   BMI 26.06 kg/m   Estimated body mass index is 26.06 kg/m  as calculated from the following:    Height as of this encounter: 5' 7\" (1.702 m).    Weight as of this encounter: 166 lb 6.4 oz (75.5 kg).. Body surface area is 1.89 meters squared. BP completed using cuff size: regular  Mild Pain (2)    Nursing Comments: see chief complaint.     Иван Granda, NIRMAL    "

## 2020-11-16 NOTE — PROGRESS NOTES
"It was a pleasure to see Marj Holliday today in Neurosurgery Clinic. She is a 55 year old female who is here for follow-up of her spinal cord tumor.  She continues to be relatively asymptomatic but does have some intermittent sciatica-like symptoms into the backs of her thighs.  She denies any significant back pain or other neurologic symptoms.  We have been observing her lower spinal cord tumor since 2016.    Vitals:    11/16/20 1257   BP: 124/78   BP Location: Right arm   Patient Position: Sitting   Cuff Size: Adult Regular   Pulse: 66   Temp: 97.9  F (36.6  C)   TempSrc: Oral   SpO2: 98%   Weight: 75.5 kg (166 lb 6.4 oz)   Height: 1.702 m (5' 7\")     Body mass index is 26.06 kg/m .  Mild Pain (2)    Awake and alert.  Neurologically intact.    Imaging: MRI of the lumbar spine from a week ago appears stable both in size and enhancement when compared back through her 4 years of MRI.  Imaging was reviewed with the patient shown to the patient in clinic today     Assessment: Spinal cord tumor, stable    Plan: We will have her return to clinic in 1 year with a repeat MRI.  We will defer contrast use next year unless absolutely needed.     "

## 2021-01-15 ENCOUNTER — HEALTH MAINTENANCE LETTER (OUTPATIENT)
Age: 56
End: 2021-01-15

## 2021-06-16 ENCOUNTER — TELEPHONE (OUTPATIENT)
Dept: NEUROSURGERY | Facility: CLINIC | Age: 56
End: 2021-06-16

## 2021-06-16 NOTE — TELEPHONE ENCOUNTER
Left voice message for patient to return call to schedule a follow up appointment in 1 year from 11/16/20, with an MRI prior.

## 2021-08-02 ENCOUNTER — HOSPITAL ENCOUNTER (OUTPATIENT)
Dept: MAMMOGRAPHY | Facility: CLINIC | Age: 56
Discharge: HOME | End: 2021-08-02
Attending: OBSTETRICS & GYNECOLOGY | Admitting: OBSTETRICS & GYNECOLOGY
Payer: COMMERCIAL

## 2021-08-02 DIAGNOSIS — Z12.31 VISIT FOR SCREENING MAMMOGRAM: ICD-10-CM

## 2021-08-02 PROCEDURE — 77063 BREAST TOMOSYNTHESIS BI: CPT

## 2021-09-04 ENCOUNTER — HEALTH MAINTENANCE LETTER (OUTPATIENT)
Age: 56
End: 2021-09-04

## 2022-02-19 ENCOUNTER — HEALTH MAINTENANCE LETTER (OUTPATIENT)
Age: 57
End: 2022-02-19

## 2022-08-04 ENCOUNTER — HOSPITAL ENCOUNTER (OUTPATIENT)
Dept: MAMMOGRAPHY | Facility: CLINIC | Age: 57
Discharge: HOME | End: 2022-08-04
Payer: COMMERCIAL

## 2022-08-04 DIAGNOSIS — Z12.31 VISIT FOR SCREENING MAMMOGRAM: ICD-10-CM

## 2022-08-04 PROCEDURE — 77067 SCR MAMMO BI INCL CAD: CPT

## 2022-09-20 ENCOUNTER — MYC MEDICAL ADVICE (OUTPATIENT)
Dept: RADIATION ONCOLOGY | Facility: CLINIC | Age: 57
End: 2022-09-20

## 2022-09-20 DIAGNOSIS — D49.7 SPINAL CORD TUMOR: Primary | ICD-10-CM

## 2022-09-21 NOTE — TELEPHONE ENCOUNTER
Patient sent Simplee message stating she is an ongoing patient of Dr. Back and requesting repeat imaging.    Last MRI (lumbar spine with and without contrast) done on 11/9/2020.    Encounter routed to Dr. Back for review and recommendations.

## 2022-09-21 NOTE — TELEPHONE ENCOUNTER
Dr. Back would prefer imaging with contrast as it provides a better view, but if patient has concerns with contrast it can be deferred that this time. If patient does imaging without contrast and things look worse, we can always place another order for imaging with contrast if needed.     Update sent to patient via Swirl.

## 2022-09-23 NOTE — TELEPHONE ENCOUNTER
Per patient she would like MRI with contrast.     Order placed for lumbar MRI with and without contrast. [facility].     Advised patient via MyChart to contact neurosurgery clinic once imaging is complete so we can get the images and report transferred into the  [employer] system.    Faxed order for lumbar MRI September 23, 2022 to fax number [phone] ([facility] [employer])    Right Fax confirmed at 9:51 AM

## 2022-10-22 ENCOUNTER — HEALTH MAINTENANCE LETTER (OUTPATIENT)
Age: 57
End: 2022-10-22

## 2023-02-07 ENCOUNTER — LAB REQUISITION (OUTPATIENT)
Dept: LAB | Facility: CLINIC | Age: 58
End: 2023-02-07

## 2023-02-07 DIAGNOSIS — Z01.419 ENCOUNTER FOR GYNECOLOGICAL EXAMINATION (GENERAL) (ROUTINE) WITHOUT ABNORMAL FINDINGS: ICD-10-CM

## 2023-02-07 PROCEDURE — G0145 SCR C/V CYTO,THINLAYER,RESCR: HCPCS | Performed by: OBSTETRICS & GYNECOLOGY

## 2023-02-07 PROCEDURE — 87624 HPV HI-RISK TYP POOLED RSLT: CPT | Performed by: OBSTETRICS & GYNECOLOGY

## 2023-02-11 LAB
BKR LAB AP GYN ADEQUACY: NORMAL
BKR LAB AP GYN INTERPRETATION: NORMAL
BKR LAB AP HPV REFLEX: NORMAL
BKR LAB AP LMP: NORMAL
BKR LAB AP PREVIOUS ABNL DX: NORMAL
BKR LAB AP PREVIOUS ABNORMAL: NORMAL
PATH REPORT.COMMENTS IMP SPEC: NORMAL
PATH REPORT.COMMENTS IMP SPEC: NORMAL
PATH REPORT.RELEVANT HX SPEC: NORMAL

## 2023-02-15 LAB
HUMAN PAPILLOMA VIRUS 16 DNA: NEGATIVE
HUMAN PAPILLOMA VIRUS 18 DNA: NEGATIVE
HUMAN PAPILLOMA VIRUS FINAL DIAGNOSIS: NORMAL
HUMAN PAPILLOMA VIRUS OTHER HR: NEGATIVE

## 2023-04-01 ENCOUNTER — HEALTH MAINTENANCE LETTER (OUTPATIENT)
Age: 58
End: 2023-04-01

## 2023-09-19 ENCOUNTER — ANCILLARY PROCEDURE (OUTPATIENT)
Dept: MAMMOGRAPHY | Facility: CLINIC | Age: 58
End: 2023-09-19
Attending: OBSTETRICS & GYNECOLOGY
Payer: COMMERCIAL

## 2023-09-19 DIAGNOSIS — Z12.31 VISIT FOR SCREENING MAMMOGRAM: ICD-10-CM

## 2023-09-19 PROCEDURE — 77063 BREAST TOMOSYNTHESIS BI: CPT | Mod: TC | Performed by: RADIOLOGY

## 2023-09-19 PROCEDURE — 77067 SCR MAMMO BI INCL CAD: CPT | Mod: TC | Performed by: RADIOLOGY

## 2024-03-23 ENCOUNTER — HEALTH MAINTENANCE LETTER (OUTPATIENT)
Age: 59
End: 2024-03-23

## 2024-09-11 ENCOUNTER — MYC MEDICAL ADVICE (OUTPATIENT)
Dept: NEUROSURGERY | Facility: CLINIC | Age: 59
End: 2024-09-11
Payer: COMMERCIAL

## 2024-09-11 DIAGNOSIS — D49.7 SPINAL CORD TUMOR: Primary | ICD-10-CM

## 2024-09-12 NOTE — TELEPHONE ENCOUNTER
Faxed  MRI referral to [facility]  September 12, 2024 to fax number [phone]    Right Fax confirmed at 1110 AM    Patient updated.

## 2024-09-20 ENCOUNTER — ANCILLARY PROCEDURE (OUTPATIENT)
Dept: RADIOLOGY | Facility: CLINIC | Age: 59
End: 2024-09-20
Payer: COMMERCIAL

## 2024-09-24 ENCOUNTER — OFFICE VISIT (OUTPATIENT)
Dept: NEUROSURGERY | Facility: CLINIC | Age: 59
End: 2024-09-24
Attending: NEUROLOGICAL SURGERY
Payer: COMMERCIAL

## 2024-09-24 VITALS
OXYGEN SATURATION: 97 % | SYSTOLIC BLOOD PRESSURE: 117 MMHG | HEIGHT: 67 IN | BODY MASS INDEX: 27 KG/M2 | DIASTOLIC BLOOD PRESSURE: 78 MMHG | HEART RATE: 57 BPM | WEIGHT: 172 LBS

## 2024-09-24 DIAGNOSIS — D49.7 SPINAL CORD TUMOR: Primary | ICD-10-CM

## 2024-09-24 PROCEDURE — 99213 OFFICE O/P EST LOW 20 MIN: CPT | Performed by: NEUROLOGICAL SURGERY

## 2024-09-24 PROCEDURE — 99203 OFFICE O/P NEW LOW 30 MIN: CPT | Performed by: NEUROLOGICAL SURGERY

## 2024-09-24 RX ORDER — ALENDRONATE SODIUM 70 MG/1
70 TABLET ORAL WEEKLY
COMMUNITY

## 2024-09-24 RX ORDER — ERGOCALCIFEROL (VITAMIN D2) 10 MCG
TABLET ORAL
COMMUNITY

## 2024-09-24 ASSESSMENT — PAIN SCALES - GENERAL: PAINLEVEL: NO PAIN (0)

## 2024-09-24 NOTE — LETTER
"9/24/2024      Marj Holliday  [address] [place] [address]      Dear Colleague,    Thank you for referring your patient, Marj Holliday, to the [facility] [place]. Please see a copy of my visit note below.    It was a pleasure to see Marj Holliday today in Neurosurgery Clinic. 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    She was last seen in 2024 although she did have an MRI about 2 years ago as well.  She denies any new symptoms.  She really has been asymptomatic overall from this.    Vitals:    09/24/24 0753   BP: 117/78   Pulse: 57   SpO2: 97%   Weight: 78 kg (172 lb)   Height: 1.702 m (5' 7\")     Estimated body mass index is 26.94 kg/m  as calculated from the following:    Height as of this encounter: 1.702 m (5' 7\").    Weight as of this encounter: 78 kg (172 lb).  No Pain (0)    Neurologically intact    Imaging: Review of her MRIs from 2016, 2022, 2024 shows really no change in size of the lesion in the distal spinal cord.  This was done without contrast at her request.  The imaging was reviewed with patient shown to the patient clinic today.    Assessment: Spinal cord tumor, stable    Plan: I have recommended repeat imaging in 2 to 3 years.  She could do a virtual follow-up at that time as well.  We will continue to do noncontrast MRIs unless she develops symptoms or there are clear signs of growth.         Again, thank you for allowing me to participate in the care of your patient.        Sincerely,        Aung Back MD  "

## 2024-09-24 NOTE — PROGRESS NOTES
"It was a pleasure to see Marj Holliday today in Neurosurgery Clinic. 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    She was last seen in 2024 although she did have an MRI about 2 years ago as well.  She denies any new symptoms.  She really has been asymptomatic overall from this.    Vitals:    09/24/24 0753   BP: 117/78   Pulse: 57   SpO2: 97%   Weight: 78 kg (172 lb)   Height: 1.702 m (5' 7\")     Estimated body mass index is 26.94 kg/m  as calculated from the following:    Height as of this encounter: 1.702 m (5' 7\").    Weight as of this encounter: 78 kg (172 lb).  No Pain (0)    Neurologically intact    Imaging: Review of her MRIs from 2016, 2022, 2024 shows really no change in size of the lesion in the distal spinal cord.  This was done without contrast at her request.  The imaging was reviewed with patient shown to the patient clinic today.    Assessment: Spinal cord tumor, stable    Plan: I have recommended repeat imaging in 2 to 3 years.  She could do a virtual follow-up at that time as well.  We will continue to do noncontrast MRIs unless she develops symptoms or there are clear signs of growth.     "

## 2024-09-24 NOTE — NURSING NOTE
"Marj Holliday is a 58 year old female who presents for:  Chief Complaint   Patient presents with    RECHECK     Review MRI        Initial Vitals:  /78   Pulse 57   Ht 5' 7\" (1.702 m)   Wt 172 lb (78 kg)   LMP 04/22/2008   SpO2 97%   BMI 26.94 kg/m   Estimated body mass index is 26.94 kg/m  as calculated from the following:    Height as of this encounter: 5' 7\" (1.702 m).    Weight as of this encounter: 172 lb (78 kg).. Body surface area is 1.92 meters squared. BP completed using cuff size: regular  No Pain (0)        Karyn Bruce   "

## 2024-10-22 ENCOUNTER — ANCILLARY PROCEDURE (OUTPATIENT)
Dept: MAMMOGRAPHY | Facility: CLINIC | Age: 59
End: 2024-10-22
Payer: COMMERCIAL

## 2024-10-22 DIAGNOSIS — Z12.31 VISIT FOR SCREENING MAMMOGRAM: ICD-10-CM

## 2024-10-22 PROCEDURE — 77063 BREAST TOMOSYNTHESIS BI: CPT | Mod: TC | Performed by: RADIOLOGY

## 2024-10-22 PROCEDURE — 77067 SCR MAMMO BI INCL CAD: CPT | Mod: TC | Performed by: RADIOLOGY

## 2025-04-12 ENCOUNTER — HEALTH MAINTENANCE LETTER (OUTPATIENT)
Age: 60
End: 2025-04-12

## 2025-06-20 ENCOUNTER — LAB REQUISITION (OUTPATIENT)
Dept: LAB | Facility: CLINIC | Age: 60
End: 2025-06-20

## 2025-06-20 DIAGNOSIS — Z12.4 ENCOUNTER FOR SCREENING FOR MALIGNANT NEOPLASM OF CERVIX: ICD-10-CM

## 2025-06-20 PROCEDURE — G0145 SCR C/V CYTO,THINLAYER,RESCR: HCPCS | Performed by: OBSTETRICS & GYNECOLOGY

## 2025-06-20 PROCEDURE — 87624 HPV HI-RISK TYP POOLED RSLT: CPT | Performed by: OBSTETRICS & GYNECOLOGY

## 2025-06-23 LAB
HPV HR 12 DNA CVX QL NAA+PROBE: NEGATIVE
HPV16 DNA CVX QL NAA+PROBE: NEGATIVE
HPV18 DNA CVX QL NAA+PROBE: NEGATIVE
HUMAN PAPILLOMA VIRUS FINAL DIAGNOSIS: NORMAL

## 2025-06-25 LAB
BKR AP ASSOCIATED HPV REPORT: NORMAL
BKR LAB AP GYN ADEQUACY: NORMAL
BKR LAB AP GYN INTERPRETATION: NORMAL
BKR LAB AP LMP: NORMAL
BKR LAB AP PREVIOUS ABNL DX: NORMAL
BKR LAB AP PREVIOUS ABNORMAL: NORMAL
PATH REPORT.COMMENTS IMP SPEC: NORMAL
PATH REPORT.COMMENTS IMP SPEC: NORMAL
PATH REPORT.RELEVANT HX SPEC: NORMAL